# Patient Record
Sex: FEMALE | Race: WHITE | NOT HISPANIC OR LATINO | Employment: OTHER | URBAN - METROPOLITAN AREA
[De-identification: names, ages, dates, MRNs, and addresses within clinical notes are randomized per-mention and may not be internally consistent; named-entity substitution may affect disease eponyms.]

---

## 2020-09-16 ENCOUNTER — OFFICE VISIT (OUTPATIENT)
Dept: OPHTHALMOLOGY | Facility: MEDICAL CENTER | Age: 65
End: 2020-09-16
Payer: COMMERCIAL

## 2020-09-16 DIAGNOSIS — H20.9 UVEITIS: ICD-10-CM

## 2020-09-16 DIAGNOSIS — H40.003 GLAUCOMA SUSPECT OF BOTH EYES: ICD-10-CM

## 2020-09-16 DIAGNOSIS — Q13.2 CORECTOPIA: ICD-10-CM

## 2020-09-16 DIAGNOSIS — H53.2 DIPLOPIA: ICD-10-CM

## 2020-09-16 DIAGNOSIS — H26.33 CATARACT OF BOTH EYES DUE TO DRUG: ICD-10-CM

## 2020-09-16 PROCEDURE — 92250 FUNDUS PHOTOGRAPHY W/I&R: CPT | Performed by: OPHTHALMOLOGY

## 2020-09-16 PROCEDURE — 99205 OFFICE O/P NEW HI 60 MIN: CPT | Mod: 25 | Performed by: OPHTHALMOLOGY

## 2020-09-16 RX ORDER — LATANOPROST 50 UG/ML
1 SOLUTION/ DROPS OPHTHALMIC NIGHTLY
COMMUNITY
End: 2022-06-24

## 2020-09-16 ASSESSMENT — ENCOUNTER SYMPTOMS
PHOTOPHOBIA: 1
EYE REDNESS: 0
EYE DISCHARGE: 0
EYE PAIN: 0
DOUBLE VISION: 1
BLURRED VISION: 1

## 2020-09-16 ASSESSMENT — EXTERNAL EXAM - RIGHT EYE: OD_EXAM: NORMAL

## 2020-09-16 ASSESSMENT — REFRACTION_MANIFEST
OD_SPHERE: PLANO
OS_SPHERE: -0.50
OS_ADD: +2.50
METHOD_AUTOREFRACTION: 1
OD_AXIS: 160
OD_ADD: +2.50
OD_CYLINDER: +0.75

## 2020-09-16 ASSESSMENT — SLIT LAMP EXAM - LIDS
COMMENTS: NORMAL
COMMENTS: NORMAL

## 2020-09-16 ASSESSMENT — TONOMETRY
OD_IOP_MMHG: 14
OS_IOP_MMHG: 14

## 2020-09-16 ASSESSMENT — EXTERNAL EXAM - LEFT EYE: OS_EXAM: NORMAL

## 2020-09-16 ASSESSMENT — REFRACTION
OD_AXIS: 170
OS_SPHERE: -0.50
OD_SPHERE: +0.25
OD_CYLINDER: +0.75
OS_CYLINDER: +0.50
OS_AXIS: 146

## 2020-09-16 ASSESSMENT — VISUAL ACUITY
METHOD: SNELLEN - LINEAR
OS_SC: 20/30
OD_SC: 20/25

## 2020-09-16 ASSESSMENT — CONF VISUAL FIELD
OS_NORMAL: 1
OD_NORMAL: 1

## 2020-09-16 ASSESSMENT — CUP TO DISC RATIO
OD_RATIO: 0.0
OS_RATIO: 0.0

## 2020-09-16 NOTE — PROGRESS NOTES
Peds/Neuro Ophthalmology:   Vidal Brooks M.D.    Date & Time note created:    9/16/2020   3:42 PM     Referring MD / APRN:  Pcp Pt States None, No att. providers found    Patient ID:  Name:             Janice Elizabeth     YOB: 1955  Age:                 65 y.o.  female   MRN:               [id number]    Chief Complaint/Reason for Visit:     Diplopia      History of Present Illness:    Janice Elizabeth is a 65 y.o. female   History of double vision, within the last year year. More for reading than for far away. History of being near sighted. Doesn't usually wear glasses execpt old reading glasses. 35 years ago had some nerve hemorrhage. Seen at [place] 4 to 5 per weeks. Never figured out why had the hemorrhage. history of uveitis and iritis both eyes. Was on prednisolone for years. 6 months self dilating the eyes. Hasn't used the eye drops in a long time.  No history of head injury, but did get hit in the left eye when a child. Thought might have MS so had MRI 1980's. No history of TB or Syphilis. Sates that the double vision is worse when closes one eye and doesn't notice with both eyes open. History of Glaucoma. On drop for two based on visual filed test change. But the pressure has been normal.Occasional ocular mirgraines.      Review of Systems:  Review of Systems   Eyes: Positive for blurred vision, double vision and photophobia. Negative for pain, discharge and redness.   All other systems reviewed and are negative.      Past Medical History:   Past Medical History:   Diagnosis Date   • Papilledema        Past Surgical History:  Past Surgical History:   Procedure Laterality Date   • ABDOMINAL HYSTERECTOMY TOTAL         Current Outpatient Medications:  Current Outpatient Medications   Medication Sig Dispense Refill   • latanoprost (XALATAN) 0.005 % Solution Place 1 Drop in left eye every evening.       No current facility-administered medications for this visit.         Allergies:  Allergies   Allergen Reactions   • Penicillins        Family History:  History reviewed. No pertinent family history.    Social History:  Social History     Socioeconomic History   • Marital status: Unknown     Spouse name: Not on file   • Number of children: Not on file   • Years of education: Not on file   • Highest education level: Not on file   Occupational History   • Not on file   Social Needs   • Financial resource strain: Not on file   • Food insecurity     Worry: Not on file     Inability: Not on file   • Transportation needs     Medical: Not on file     Non-medical: Not on file   Tobacco Use   • Smoking status: Former Smoker     Quit date: 1983     Years since quittin.4   • Smokeless tobacco: Never Used   Substance and Sexual Activity   • Alcohol use: Yes   • Drug use: No   • Sexual activity: Not on file   Lifestyle   • Physical activity     Days per week: Not on file     Minutes per session: Not on file   • Stress: Not on file   Relationships   • Social connections     Talks on phone: Not on file     Gets together: Not on file     Attends [employer] service: Not on file     Active member of club or organization: Not on file     Attends meetings of clubs or organizations: Not on file     Relationship status: Not on file   • Intimate partner violence     Fear of current or ex partner: Not on file     Emotionally abused: Not on file     Physically abused: Not on file     Forced sexual activity: Not on file   Other Topics Concern   • Not on file   Social History Narrative   • Not on file          Physical Exam:  Physical Exam    Oriented x 3  Weight/BMI: There is no height or weight on file to calculate BMI.  There were no vitals taken for this visit.    Base Eye Exam     Visual Acuity (Snellen - Linear)       Right Left    Dist sc 20/25 20/30          Tonometry (2:57 PM)       Right Left    Pressure 14 14          Pupils       Dark React    Right 6 Minimal    Left 4 Minimal           Visual Fields       Right Left     Full Full          Extraocular Movement       Right Left     Full Full          Neuro/Psych     Oriented x3: Yes    Mood/Affect: Normal          Dilation     Both eyes: Tropicamide (MYDRIACYL) 1% ophthalmic solution, Phenylephrine (NEOSYNEPHRINE) ophthalmic solution 2.5%, Cyclopentolate (CYCLOGYL) 1% ophthalmic solution @ 2:57 PM            Strabismus Exam     Correction: cc    Distance Near Near +3DS N Bifocals     X(T)' 4               0 0 0   0 0 0                       0  0  Ortho  0  0                       0 0 0   0 0 0                   Slit Lamp and Fundus Exam     External Exam       Right Left    External Normal Normal          Slit Lamp Exam       Right Left    Lids/Lashes Normal Normal    Conjunctiva/Sclera White and quiet White and quiet    Cornea Clear Clear    Anterior Chamber Trace Cell 1+ Cell    Iris Irregular pupil, Iris atrophy Iris atrophy, Irregular pupil    Lens Posterior subcapsular cataract Posterior subcapsular cataract    Vitreous Normal Normal          Fundus Exam       Right Left    Disc Pallor area of segmental atrophy    C/D Ratio 0.0 0.0    Macula Normal Normal    Vessels Normal Normal    Periphery Pigmentation Pigmentation            Refraction     Manifest Refraction (Auto)       Sphere Cylinder Axis Dist VA Add    Right [place] +0.75 160 20/20 +2.50    Left -0.50   20/20 +2.50          Cycloplegic Refraction (Auto)       Sphere Cylinder Axis    Right +0.25 +0.75 170    Left -0.50 +0.50 146          Final Rx       Sphere Cylinder Axis Dist VA Add    Right [place] +0.75 160 20/20 +2.50    Left -0.50   20/20 +2.50                Pertinent Lab/Test/Imaging Review:      Assessment and Plan:     Uveitis  9/16/2020 - history of bilateral uveitis about 30 years ago seen and was followed at [facility]. According to patient had a complete work up including MRI scans and blood tests for infectious and inflammatory conditions including syphilis and TB that was  unremarkable. Was on prednisolone in the past but not recently. Also had episode of optic nerve swelling and hemorrhage, she states in the left eye. She also had some iris atrophy and corectopia secondary to the chronic uveitis. At one point she was dilated chronically. Currently is are still some signs of mild iritis. There is trace cell  In the right and 1+ cell in the left. Recommend that Dr Rodriguez continue to monitor and if worsens would need to reinstate steroids.     Diplopia  9/16/2020 - Referred for diplopia, but the double vision is more of a ghosting and irregular second image that is monocular in nature. Although she does have some exophoria at near, she has good fusion and the double vision is better under binocular conditions. The monocular diplopia is most likely related to uncorrected refractive error, the corectopia, early PSC cataracts, mild keratoprecipitates and the uveitis. Was able to manifest improvement in vision at distance and near with some improvement in the monocular diplopia. Therefore gave glasses rx to wear full time. If with the glasses on she were to develop true binocular diplopia because of the mild exo at near discussed that could return for incorporation of mild bas in prisms in a full frame reading glass.      Glaucoma suspect of both eyes  9/16/2020 - according to the patient had repeat visual field tests by Dr Rodriguez who noticed some progressive change. Unfortunately those filed tests were not provided to me for my review. She states her pressure has been normal. On exam today the IOP is normal. OCT NFL thickness was aver 81 OD and 82 OS which is in the normal range. There is some segmental decrease in the OD superior, but has normal ganglion cell layer and no cupping. There is visually some atrophy of both nerves but has a history of some form of nerve swelling and hemorrhage. She states that it was in the left eye, but her OCT is normal in that eye. 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 Therefore recommend Dr Rodriguez follow IOP / OCT and visual fields.     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 Most likely secondary to iris atrophy from the uveitis        Vidal Brooks M.D.

## 2020-09-16 NOTE — ASSESSMENT & PLAN NOTE
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 Most likely secondary to iris atrophy from the uveitis

## 2020-09-16 NOTE — ASSESSMENT & PLAN NOTE
9/16/2020 - according to the patient had repeat visual field tests by Dr Rodriguez who noticed some progressive change. Unfortunately those filed tests were not provided to me for my review. She states her pressure has been normal. On exam today the IOP is normal. OCT NFL thickness was aver 81 OD and 82 OS which is in the normal range. There is some segmental decrease in the OD superior, but has normal ganglion cell layer and no cupping. There is visually some atrophy of both nerves but has a history of some form of nerve swelling and hemorrhage. She states that it was in the left eye, but her OCT is normal in that eye. 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 Therefore recommend Dr Rodriguez follow IOP / OCT and visual vasquez.

## 2020-09-16 NOTE — ASSESSMENT & PLAN NOTE
9/16/2020 - history of bilateral uveitis about 30 years ago seen and was followed at [facility]. According to patient had a complete work up including MRI scans and blood tests for infectious and inflammatory conditions including syphilis and TB that was unremarkable. Was on prednisolone in the past but not recently. Also had episode of optic nerve swelling and hemorrhage, she states in the left eye. She also had some iris atrophy and corectopia secondary to the chronic uveitis. At one point she was dilated chronically. Currently is are still some signs of mild iritis. There is trace cell  In the right and 1+ cell in the left. Recommend that Dr Rodriguez continue to monitor and if worsens would need to reinstate steroids.

## 2020-09-16 NOTE — ASSESSMENT & PLAN NOTE
9/16/2020 - Referred for diplopia, but the double vision is more of a ghosting and irregular second image that is monocular in nature. Although she does have some exophoria at near, she has good fusion and the double vision is better under binocular conditions. The monocular diplopia is most likely related to uncorrected refractive error, the corectopia, early PSC cataracts, mild keratoprecipitates and the uveitis. Was able to manifest improvement in vision at distance and near with some improvement in the monocular diplopia. Therefore gave glasses rx to wear full time. If with the glasses on she were to develop true binocular diplopia because of the mild exo at near discussed that could return for incorporation of mild bas in prisms in a full frame reading glass.

## 2020-09-16 NOTE — ASSESSMENT & PLAN NOTE
9/16/2020 - bilateral early PSC cataract most likely secondary to steroids and uveitis. Also mos likely contributing to the monocular diplopia

## 2021-05-05 ENCOUNTER — APPOINTMENT (OUTPATIENT)
Dept: OPHTHALMOLOGY | Facility: MEDICAL CENTER | Age: 66
End: 2021-05-05
Payer: COMMERCIAL

## 2023-11-03 ENCOUNTER — TELEPHONE (OUTPATIENT)
Dept: CARDIOLOGY | Facility: MEDICAL CENTER | Age: 68
End: 2023-11-03
Payer: MEDICARE

## 2023-11-08 ENCOUNTER — OFFICE VISIT (OUTPATIENT)
Dept: CARDIOLOGY | Facility: PHYSICIAN GROUP | Age: 68
End: 2023-11-08
Payer: MEDICARE

## 2023-11-08 VITALS
HEART RATE: 54 BPM | OXYGEN SATURATION: 98 % | BODY MASS INDEX: 29.79 KG/M2 | RESPIRATION RATE: 12 BRPM | HEIGHT: 65 IN | WEIGHT: 178.79 LBS | SYSTOLIC BLOOD PRESSURE: 114 MMHG | DIASTOLIC BLOOD PRESSURE: 62 MMHG

## 2023-11-08 DIAGNOSIS — R00.1 BRADYCARDIA: ICD-10-CM

## 2023-11-08 DIAGNOSIS — U07.1 COVID-19 VIRUS INFECTION: ICD-10-CM

## 2023-11-08 DIAGNOSIS — R42 EPISODIC LIGHTHEADEDNESS: ICD-10-CM

## 2023-11-08 PROCEDURE — 3078F DIAST BP <80 MM HG: CPT | Performed by: INTERNAL MEDICINE

## 2023-11-08 PROCEDURE — 99204 OFFICE O/P NEW MOD 45 MIN: CPT | Performed by: INTERNAL MEDICINE

## 2023-11-08 PROCEDURE — 3074F SYST BP LT 130 MM HG: CPT | Performed by: INTERNAL MEDICINE

## 2023-11-08 ASSESSMENT — ENCOUNTER SYMPTOMS
SHORTNESS OF BREATH: 1
LOSS OF CONSCIOUSNESS: 0
ORTHOPNEA: 0
COUGH: 0
DIZZINESS: 1
PALPITATIONS: 0
PND: 0
MYALGIAS: 0

## 2023-11-08 ASSESSMENT — FIBROSIS 4 INDEX: FIB4 SCORE: 1.75

## 2023-11-08 NOTE — PROGRESS NOTES
Cardiology Initial Consultation Note    Date of note:    11/8/2023    Primary Care Provider: Luis Antonio Bergman M.D.  Referring Provider: No ref. provider found    Patient Name: Janice Elizabeth     YOB: 1955  MRN:              [id number]    Chief Complaint   Patient presents with    New Patient     Dx: Dizziness       History of Present Illness: Ms. Janice Elizabeth is a 68 y.o.-year-old woman with no significant past cardiac history who presents to the cardiology office for further evaluation of episodic lightheadedness and bradycardia.    Patient was at [facility] emergency department for evaluation of the symptoms of the last month on October 18, 2023.  She states that her symptoms developed shortly after COVID-19 infection.  She had flulike symptoms in addition to shortness of breath with COVID infection.  On 10/18/2023, she decided to seek medical attention due to bradycardia with heart rates in the high 40s associated with transient lightheadedness.  Work-up in the ED including TSH was within normal limits.  No major electrolyte abnormalities.  In addition, EKG showed sinus bradycardia without significant pauses or high-grade AV block.  Due to some of her respiratory symptoms, she had a CTA PE study performed which was negative for pulmonary embolism.    Since her ED visit, her symptoms have improved overall.  Her heart rate on her Apple Watch ranges from 50 to 60 bpm.  She rarely gets lightheadedness/dizziness.  Denies having episodes of syncope.  Has been able to resume yard work and exertional activities without significant symptoms.  She denies having exertional chest pain or dyspnea.  No lower extremity edema, orthopnea or syncope.     She just finishing wearing a cardiac monitor for 1 week that was recently returned. Results are still pending.      Cardiovascular Risk Factors:  1. Smoking status: Denies  2. Type II Diabetes Mellitus: Denies   Lab Results   Component Value  Date/Time    HBA1C 5.6 2023 09:32 AM    HBA1C 5.6 2021 11:37 AM     3. Hypertension: Denies  4. Dyslipidemia: Denies, not on lipid lowering therapy   5. Family history of early Coronary Artery Disease in a first degree relative (Male less than 55 years of age; Female less than 65 years of age): Denies      Review of Systems:  Review of Systems   Respiratory:  Positive for shortness of breath. Negative for cough.    Cardiovascular:  Negative for chest pain, palpitations, orthopnea, leg swelling and PND.   Musculoskeletal:  Negative for joint pain and myalgias.   Neurological:  Positive for dizziness. Negative for loss of consciousness.       Past Medical History:   Diagnosis Date    Papilledema          Past Surgical History:   Procedure Laterality Date    ABDOMINAL HYSTERECTOMY TOTAL           Current Outpatient Medications   Medication Sig Dispense Refill    estradiol (ESTRACE) 0.1 MG/GM vaginal cream Apply approximately 1 g intravaginally 3 days/week for 2 weeks then lower dose to 2 days/week 1 Each 3    oxybutynin SR (DITROPAN-XL) 10 MG CR tablet Take 1 Tablet by mouth every day.       No current facility-administered medications for this visit.         Allergies   Allergen Reactions    Penicillins          History reviewed. No pertinent family history.      Social History     Socioeconomic History    Marital status: Single     Spouse name: Not on file    Number of children: Not on file    Years of education: Not on file    Highest education level: Not on file   Occupational History    Not on file   Tobacco Use    Smoking status: Former     Current packs/day: 0.00     Types: Cigarettes     Quit date: 1983     Years since quittin.6     Passive exposure: Past    Smokeless tobacco: Never   Vaping Use    Vaping Use: Never used   Substance and Sexual Activity    Alcohol use: Yes     Comment: 2 glasses wine weekly    Drug use: No    Sexual activity: Not on file   Other Topics Concern    Not on file    Social History Narrative    Not on file     Social Determinants of Health     Financial Resource Strain: Low Risk  (7/19/2023)    Overall Financial Resource Strain (CARDIA)     Difficulty of Paying Living Expenses: Not hard at all   Food Insecurity: No Food Insecurity (7/19/2023)    Hunger Vital Sign     Worried About Running Out of Food in the Last Year: Never true     Ran Out of Food in the Last Year: Never true   Transportation Needs: No Transportation Needs (7/19/2023)    PRAPARE - Transportation     Lack of Transportation (Medical): No     Lack of Transportation (Non-Medical): No   Physical Activity: Sufficiently Active (7/19/2023)    Exercise Vital Sign     Days of Exercise per Week: 6 days     Minutes of Exercise per Session: 30 min   Stress: No Stress Concern Present (7/19/2023)    [employer] - Occupational Stress Questionnaire     Feeling of Stress : Not at all   Social Connections: Unknown (7/19/2023)    Social Connection and Isolation Panel [NHANES]     Frequency of Communication with Friends and Family: Patient refused     Frequency of Social Gatherings with Friends and Family: Patient refused     Attends Jew Services: Patient refused     Active Member of Clubs or Organizations: No     Attends Club or Organization Meetings: More than 4 times per year     Marital Status:    Intimate Partner Violence: Not At Risk (7/19/2023)    Humiliation, Afraid, Rape, and Kick questionnaire     Fear of Current or Ex-Partner: No     Emotionally Abused: No     Physically Abused: No     Sexually Abused: No   Housing Stability: Low Risk  (7/19/2023)    Housing Stability Vital Sign     Unable to Pay for Housing in the Last Year: No     Number of Places Lived in the Last Year: 1     Unstable Housing in the Last Year: No         Physical Exam:  Ambulatory Vitals  /62 (BP Location: Left arm, Patient Position: Sitting, BP Cuff Size: Adult)   Pulse (!) 54   Resp 12   Ht 1.651  "m (5' 5\")   Wt 81.1 kg (178 lb 12.7 oz)   SpO2 98%    Oxygen Therapy:  Pulse Oximetry: 98 %  BP Readings from Last 4 Encounters:   11/08/23 114/62   10/30/23 96/62   10/18/23 132/72   09/09/23 122/70       Weight/BMI: Body mass index is 29.75 kg/m².  Wt Readings from Last 4 Encounters:   11/08/23 81.1 kg (178 lb 12.7 oz)   10/30/23 80 kg (176 lb 6.4 oz)   10/18/23 81.9 kg (180 lb 8.9 oz)   09/09/23 83 kg (183 lb)         General: Not in acute distress, appears comfortable  HEENT: OP clear   Neck:  No carotid bruits, No JVD appreciated  CVS:  RRR, Normal S1, S2. No murmurs, rubs or gallops  Resp: Normal respiratory effort, lungs CTA bilaterally. No rales or rhonchi  Abdomen: Soft, non-distended, non-tender to palpation, no guarding or rigidity  Skin: No obvious rashes, no cyanosis  Neurological: Alert and oriented x3, moves all extremities, no focal neurologic deficits  Psychiatric: Appropriate affect  Extremities:   Extremities warm, 2+ bilateral radial pulses.  2+ bilateral dp pulses, no lower extremity edema bilaterally      Lab Data Review:  Lab Results   Component Value Date/Time    SODIUM 138 10/18/2023 07:30 PM    POTASSIUM 3.6 10/18/2023 07:30 PM    CHLORIDE 103 10/18/2023 07:30 PM    CO2 30 10/18/2023 07:30 PM    GLUCOSE 116 (H) 10/18/2023 07:30 PM    BUN 17 10/18/2023 07:30 PM    CREATININE 0.9 10/18/2023 07:30 PM     Lab Results   Component Value Date/Time    ALKPHOSPHAT 95 10/18/2023 07:30 PM    ASTSGOT 23 10/18/2023 07:30 PM    ALTSGPT 15 10/18/2023 07:30 PM    TBILIRUBIN 0.5 10/18/2023 07:30 PM      Lab Results   Component Value Date/Time    WBC 6.3 10/18/2023 07:30 PM    HEMOGLOBIN 13.7 10/18/2023 07:30 PM     Lab Results   Component Value Date/Time    HBA1C 5.6 07/17/2023 09:32 AM    HBA1C 5.6 05/04/2021 11:37 AM         Cardiac Imaging and Procedures Review:    EKG dated 10/18/2023:   My personal interpretation is sinus bradycardia, rate 48 bpm, no ischemic changes, no high grade AV " block        Assessment & Plan     1. Bradycardia  EC-ECHOCARDIOGRAM COMPLETE W/O CONT      2. Episodic lightheadedness  EC-ECHOCARDIOGRAM COMPLETE W/O CONT      3. COVID-19 virus infection              Medical Decision Making:  Ms. Janice Elizabeth is a 68 y.o.-year-old woman with no significant past cardiac history who presents to the cardiology office for further evaluation of episodic lightheadedness and bradycardia.    1. Bradycardia  2. Episodic lightheadedness  3. COVID-19 virus infection    Symptoms of lightheadedness and associated bradycardia have slowly improved. No red flag symptoms of chest pain or syncope.  Thyroid function test were performed and were within normal limits.  Certainly possible that her bradycardia may be related to recent COVID 19 infection. HR in office today at 54 bpm. HR increased appropriately to 86 bpm after standing and walking in the exam room. She has already completed outpatient cardiac monitoring for 1 week.  Results of the study currently pending.  To complete cardiac work-up, recommend checking echocardiogram to rule out major cardiac structural abnormalities. If workup is within normal limits, will simply require expectant management. No additional cardiac workup recommended.    It was a pleasure seeing Ms. Janice Elizabeth in the office today. Return if symptoms worsen or fail to improve. Patient is aware to call the cardiology clinic with any questions or concerns.      Ti Gonzalez MD, [facility]  Cardiologist, [facility], [facility]  [address] [address]  Phone: [phone]  Fax: [phone]    Please note that this dictation was created using voice recognition software. I have made every reasonable attempt to correct obvious errors, but it is possible there are errors of grammar and possibly content that I did not discover before finalizing the note.